# Patient Record
Sex: FEMALE | Race: WHITE | Employment: UNEMPLOYED | URBAN - METROPOLITAN AREA
[De-identification: names, ages, dates, MRNs, and addresses within clinical notes are randomized per-mention and may not be internally consistent; named-entity substitution may affect disease eponyms.]

---

## 2017-07-11 ENCOUNTER — OFFICE VISIT (OUTPATIENT)
Dept: ORTHOPEDIC SURGERY | Age: 42
End: 2017-07-11

## 2017-07-11 VITALS
WEIGHT: 214 LBS | BODY MASS INDEX: 37.92 KG/M2 | HEIGHT: 63 IN | SYSTOLIC BLOOD PRESSURE: 135 MMHG | DIASTOLIC BLOOD PRESSURE: 92 MMHG | HEART RATE: 77 BPM

## 2017-07-11 DIAGNOSIS — M25.521 BILATERAL ELBOW JOINT PAIN: Primary | ICD-10-CM

## 2017-07-11 DIAGNOSIS — M25.522 BILATERAL ELBOW JOINT PAIN: Primary | ICD-10-CM

## 2017-07-11 DIAGNOSIS — G56.23 CUBITAL TUNNEL SYNDROME OF BOTH UPPER EXTREMITIES: ICD-10-CM

## 2017-07-11 PROCEDURE — 99243 OFF/OP CNSLTJ NEW/EST LOW 30: CPT | Performed by: ORTHOPAEDIC SURGERY

## 2017-07-11 RX ORDER — ACETAMINOPHEN 325 MG/1
650 TABLET ORAL EVERY 6 HOURS PRN
COMMUNITY
End: 2017-07-11 | Stop reason: ALTCHOICE

## 2017-07-13 ENCOUNTER — OFFICE VISIT (OUTPATIENT)
Dept: ORTHOPEDIC SURGERY | Age: 42
End: 2017-07-13

## 2017-07-13 DIAGNOSIS — M79.601 PAIN IN BOTH UPPER EXTREMITIES: Primary | ICD-10-CM

## 2017-07-13 DIAGNOSIS — M79.602 PAIN IN BOTH UPPER EXTREMITIES: Primary | ICD-10-CM

## 2017-07-13 PROCEDURE — 95909 NRV CNDJ TST 5-6 STUDIES: CPT | Performed by: PHYSICAL MEDICINE & REHABILITATION

## 2017-07-13 PROCEDURE — 95886 MUSC TEST DONE W/N TEST COMP: CPT | Performed by: PHYSICAL MEDICINE & REHABILITATION

## 2017-07-18 ENCOUNTER — INITIAL CONSULT (OUTPATIENT)
Dept: SURGERY | Age: 42
End: 2017-07-18

## 2017-07-18 ENCOUNTER — OFFICE VISIT (OUTPATIENT)
Dept: ORTHOPEDIC SURGERY | Age: 42
End: 2017-07-18

## 2017-07-18 VITALS — WEIGHT: 212.96 LBS | BODY MASS INDEX: 39.19 KG/M2 | HEIGHT: 62 IN

## 2017-07-18 VITALS
SYSTOLIC BLOOD PRESSURE: 122 MMHG | DIASTOLIC BLOOD PRESSURE: 80 MMHG | BODY MASS INDEX: 37.98 KG/M2 | HEIGHT: 62 IN | WEIGHT: 206.4 LBS | HEART RATE: 81 BPM

## 2017-07-18 DIAGNOSIS — G56.23 CUBITAL TUNNEL SYNDROME OF BOTH UPPER EXTREMITIES: ICD-10-CM

## 2017-07-18 DIAGNOSIS — M79.601 PAIN IN BOTH UPPER EXTREMITIES: Primary | ICD-10-CM

## 2017-07-18 DIAGNOSIS — R10.11 RUQ ABDOMINAL PAIN: ICD-10-CM

## 2017-07-18 DIAGNOSIS — M79.602 PAIN IN BOTH UPPER EXTREMITIES: Primary | ICD-10-CM

## 2017-07-18 PROCEDURE — 99243 OFF/OP CNSLTJ NEW/EST LOW 30: CPT | Performed by: SURGERY

## 2017-07-18 PROCEDURE — 99213 OFFICE O/P EST LOW 20 MIN: CPT | Performed by: ORTHOPAEDIC SURGERY

## 2017-07-18 RX ORDER — HYDROCHLOROTHIAZIDE 12.5 MG/1
TABLET ORAL
COMMUNITY
Start: 2017-07-17 | End: 2017-10-23 | Stop reason: ALTCHOICE

## 2017-07-18 RX ORDER — METHYLPREDNISOLONE 4 MG/1
TABLET ORAL
Qty: 1 KIT | Refills: 0 | Status: SHIPPED | OUTPATIENT
Start: 2017-07-18 | End: 2017-07-24

## 2017-07-18 RX ORDER — HYDROXYZINE PAMOATE 25 MG/1
CAPSULE ORAL
COMMUNITY
Start: 2017-07-17 | End: 2017-10-23 | Stop reason: ALTCHOICE

## 2017-07-18 RX ORDER — PAROXETINE HYDROCHLORIDE 20 MG/1
TABLET, FILM COATED ORAL
COMMUNITY
Start: 2017-07-17 | End: 2017-10-23 | Stop reason: ALTCHOICE

## 2017-07-19 DIAGNOSIS — R10.11 RUQ ABDOMINAL PAIN: Primary | ICD-10-CM

## 2017-07-27 ENCOUNTER — HOSPITAL ENCOUNTER (OUTPATIENT)
Dept: NUCLEAR MEDICINE | Age: 42
Discharge: HOME | End: 2017-07-27
Attending: SURGERY | Admitting: SURGERY

## 2017-07-27 DIAGNOSIS — R10.11 RUQ ABDOMINAL PAIN: ICD-10-CM

## 2017-07-27 DIAGNOSIS — R10.11 RIGHT UPPER QUADRANT PAIN: ICD-10-CM

## 2017-07-27 RX ADMIN — Medication 6 MILLICURIE: at 08:12

## 2017-08-07 ENCOUNTER — HOSPITAL ENCOUNTER (OUTPATIENT)
Dept: CT IMAGING | Age: 42
Discharge: HOME | End: 2017-08-07
Attending: SURGERY | Admitting: SURGERY

## 2017-08-07 DIAGNOSIS — R10.11 RUQ ABDOMINAL PAIN: ICD-10-CM

## 2017-08-07 DIAGNOSIS — R10.11 RIGHT UPPER QUADRANT PAIN: ICD-10-CM

## 2017-08-09 ENCOUNTER — TELEPHONE (OUTPATIENT)
Dept: SURGERY | Age: 42
End: 2017-08-09

## 2017-09-20 ENCOUNTER — HOSPITAL ENCOUNTER (OUTPATIENT)
Dept: MAMMOGRAPHY | Age: 42
Discharge: HOME | End: 2017-09-20
Attending: NURSE PRACTITIONER | Admitting: NURSE PRACTITIONER

## 2017-09-20 DIAGNOSIS — Z12.31 VISIT FOR SCREENING MAMMOGRAM: ICD-10-CM

## 2017-10-23 ENCOUNTER — HOSPITAL ENCOUNTER (OUTPATIENT)
Dept: SURGERY | Age: 42
Discharge: HOME | End: 2017-10-23
Attending: INTERNAL MEDICINE | Admitting: INTERNAL MEDICINE

## 2017-10-23 VITALS
WEIGHT: 214 LBS | RESPIRATION RATE: 16 BRPM | OXYGEN SATURATION: 100 % | HEART RATE: 90 BPM | TEMPERATURE: 97.3 F | DIASTOLIC BLOOD PRESSURE: 77 MMHG | SYSTOLIC BLOOD PRESSURE: 118 MMHG | HEIGHT: 62 IN | BODY MASS INDEX: 39.38 KG/M2

## 2017-10-23 DIAGNOSIS — R10.11 RIGHT UPPER QUADRANT PAIN: ICD-10-CM

## 2017-10-23 LAB — PREGNANCY, URINE: NEGATIVE

## 2017-10-23 RX ORDER — ACETAMINOPHEN 325 MG/1
650 TABLET ORAL EVERY 6 HOURS PRN
COMMUNITY

## 2017-10-23 RX ORDER — IBUPROFEN 200 MG
200 TABLET ORAL EVERY 6 HOURS PRN
COMMUNITY
End: 2020-11-10

## 2017-10-23 RX ORDER — LIDOCAINE HYDROCHLORIDE 10 MG/ML
0.1 INJECTION, SOLUTION INFILTRATION; PERINEURAL ONCE
Status: DISCONTINUED | OUTPATIENT
Start: 2017-10-23 | End: 2017-10-24 | Stop reason: HOSPADM

## 2017-10-23 RX ORDER — SODIUM CHLORIDE, SODIUM LACTATE, POTASSIUM CHLORIDE, CALCIUM CHLORIDE 600; 310; 30; 20 MG/100ML; MG/100ML; MG/100ML; MG/100ML
INJECTION, SOLUTION INTRAVENOUS CONTINUOUS
Status: DISCONTINUED | OUTPATIENT
Start: 2017-10-23 | End: 2017-10-24 | Stop reason: HOSPADM

## 2017-10-23 RX ADMIN — SODIUM CHLORIDE, SODIUM LACTATE, POTASSIUM CHLORIDE, CALCIUM CHLORIDE: 600; 310; 30; 20 INJECTION, SOLUTION INTRAVENOUS at 13:22

## 2017-10-23 ASSESSMENT — PAIN DESCRIPTION - DESCRIPTORS
DESCRIPTORS: DULL
DESCRIPTORS: SHARP

## 2017-10-23 ASSESSMENT — PAIN DESCRIPTION - LOCATION: LOCATION: ABDOMEN

## 2017-10-23 ASSESSMENT — PAIN - FUNCTIONAL ASSESSMENT: PAIN_FUNCTIONAL_ASSESSMENT: 0-10

## 2017-10-23 ASSESSMENT — PAIN SCALES - GENERAL
PAINLEVEL_OUTOF10: 0
PAINLEVEL_OUTOF10: 2
PAINLEVEL_OUTOF10: 0
PAINLEVEL_OUTOF10: 0

## 2017-10-23 ASSESSMENT — PAIN DESCRIPTION - ORIENTATION: ORIENTATION: RIGHT;UPPER

## 2017-10-23 NOTE — H&P
Pre-sedation Assessment    HPI: abdominal pain  Nurses notes reviewed and agreed. Medications and Allergies reviewed    Physical Exam:  Airway:Normal  Cardiac:Normal  Pulmonary:Normal  Abdomen:Normal    Assessment/Plan:  ASA Grade 2 - Patient with mild systemic disease with no functional limitations  Level of Sedation Plan: Moderate sedation  Post Procedure plan: Return to same level of care  I have explained the risk, benefits, and alternatives to the procedure. The patient understands and agrees to proceed.   Yes    Keaton Velez  1:54 PM 10/23/2017

## 2017-10-23 NOTE — PROGRESS NOTES
Advanced pt from lying to sitting without adverse event/pt denies complaints of dizziness/ponv/ RESP  WNL/ surgical drsg/circulation checks on operative limb unchanged from my  last pacu assessment entry     Pt states pain is at a tolerable level-     instructed pt if no void in 8 hours contact physician or go to ER    Discharge instructions reviewed with patient/responsible adult and understanding verbalized. Discharge instructions signed and copies given. Rx                    Given to pts responsible adult/instructed not to drive/ingest alcohol while taking narcotic medications. Instructed pt/family member Last dose of narcotics given in recovery room was              @             Am/pm  . Medication information  sheet given to pt/family-all questions answered     Please follow narcotic administration as prescribed by your surgeon- any questions call your surgeon. If you have any problems contact your surgeon and  Go to the emergency room.     Operative drsg unchanged from my initial pacu assessment    Assisted pt to passenger side of car in no acute distress-/  with significant other as - VSS upon d/c

## 2017-10-24 NOTE — OP NOTE
[address]                               OPERATIVE REPORT    PATIENT NAME: Dipesh Bob                   :             1975  MED REC NO:   [id number]                           ROOM:  ACCOUNT NO:   [de-identified]                           ADMISSION DATE:  10/23/2017  PROVIDER:     Bijal Mcclain MD    DATE OF PROCEDURE:  10/23/2017      PREOPERATIVE DIAGNOSIS:  Abdominal pain. POSTOPERATIVE DIAGNOSIS:  Gastritis. PROCEDURE DONE:  EGD and biopsy. SURGEON:  Bijal Mcclain MD    INDICATIONS:  This is a 51-year-old referred for evaluation because of  abdominal pain. Gallbladder workup was essentially negative including  HIDA scan. OPERATIVE PROCEDURE:  Informed consent was obtained. IV sedation was  given with fentanyl and Versed. Continuous cardiac, oxygen, and blood  pressure monitoring done. ESOPHAGUS:  Upper, middle, and lower esophagus normal.    STOMACH:  Cardia, fundus normal.  The junction of the body and fundus  has gastritis. Biopsy obtained. Antrum normal.  Pylorus and mid  duodenum normal.    IMPRESSION:  Gastritis of fundus and body normal, otherwise  suggestions will be to go and continue with present therapy, have her  avoid NSAIDs and we will follow her clinical response.           Carola Pope MD    D: 10/23/2017 14:25:37       T: 10/23/2017 15:31:58     [id number]  Job#: [id number]     Doc#: [id number]    CC:  Kwan Hankins NP

## 2017-12-19 ENCOUNTER — HOSPITAL ENCOUNTER (OUTPATIENT)
Dept: OTHER | Age: 42
Discharge: HOME | End: 2017-12-19
Attending: INTERNAL MEDICINE | Admitting: INTERNAL MEDICINE

## 2017-12-20 LAB — H PYLORI BREATH TEST: NEGATIVE

## 2018-02-13 ENCOUNTER — TELEPHONE (OUTPATIENT)
Dept: ORTHOPEDIC SURGERY | Age: 43
End: 2018-02-13

## 2020-11-09 ENCOUNTER — APPOINTMENT (OUTPATIENT)
Dept: GENERAL RADIOLOGY | Age: 45
End: 2020-11-09
Payer: COMMERCIAL

## 2020-11-09 ENCOUNTER — HOSPITAL ENCOUNTER (EMERGENCY)
Age: 45
Discharge: HOME | End: 2020-11-10
Attending: EMERGENCY MEDICINE
Payer: COMMERCIAL

## 2020-11-09 VITALS
OXYGEN SATURATION: 98 % | TEMPERATURE: 98.9 F | WEIGHT: 230 LBS | HEART RATE: 91 BPM | RESPIRATION RATE: 18 BRPM | HEIGHT: 63 IN | BODY MASS INDEX: 40.75 KG/M2

## 2020-11-09 LAB
A/G RATIO: 1.4 (ref 1.1–2.2)
ALBUMIN SERPL-MCNC: 4.5 G/DL (ref 3.4–5)
ALP BLD-CCNC: 118 U/L (ref 40–129)
ALT SERPL-CCNC: 97 U/L (ref 10–40)
ANION GAP SERPL CALCULATED.3IONS-SCNC: 12 MMOL/L (ref 3–16)
AST SERPL-CCNC: 89 U/L (ref 15–37)
BACTERIA: ABNORMAL /HPF
BASOPHILS ABSOLUTE: 0.1 K/UL (ref 0–0.2)
BASOPHILS RELATIVE PERCENT: 1 %
BILIRUB SERPL-MCNC: 0.5 MG/DL (ref 0–1)
BILIRUBIN URINE: NEGATIVE
BLOOD, URINE: ABNORMAL
BUN BLDV-MCNC: 10 MG/DL (ref 7–20)
CALCIUM SERPL-MCNC: 9.2 MG/DL (ref 8.3–10.6)
CHLORIDE BLD-SCNC: 107 MMOL/L (ref 99–110)
CLARITY: ABNORMAL
CO2: 23 MMOL/L (ref 21–32)
COLOR: YELLOW
CREAT SERPL-MCNC: 0.6 MG/DL (ref 0.6–1.1)
CRYSTALS, UA: ABNORMAL /HPF
EOSINOPHILS ABSOLUTE: 0.1 K/UL (ref 0–0.6)
EOSINOPHILS RELATIVE PERCENT: 1.5 %
EPITHELIAL CELLS, UA: ABNORMAL /HPF (ref 0–5)
GFR AFRICAN AMERICAN: >60
GFR NON-AFRICAN AMERICAN: >60
GLOBULIN: 3.3 G/DL
GLUCOSE BLD-MCNC: 119 MG/DL (ref 70–99)
GLUCOSE URINE: NEGATIVE MG/DL
HCG QUALITATIVE: NEGATIVE
HCT VFR BLD CALC: 46.7 % (ref 36–48)
HEMOGLOBIN: 15.6 G/DL (ref 12–16)
KETONES, URINE: NEGATIVE MG/DL
LEUKOCYTE ESTERASE, URINE: ABNORMAL
LIPASE: 31 U/L (ref 13–60)
LYMPHOCYTES ABSOLUTE: 1.2 K/UL (ref 1–5.1)
LYMPHOCYTES RELATIVE PERCENT: 18.4 %
MCH RBC QN AUTO: 30.9 PG (ref 26–34)
MCHC RBC AUTO-ENTMCNC: 33.5 G/DL (ref 31–36)
MCV RBC AUTO: 92.3 FL (ref 80–100)
MICROSCOPIC EXAMINATION: YES
MONOCYTES ABSOLUTE: 1 K/UL (ref 0–1.3)
MONOCYTES RELATIVE PERCENT: 14.6 %
NEUTROPHILS ABSOLUTE: 4.4 K/UL (ref 1.7–7.7)
NEUTROPHILS RELATIVE PERCENT: 64.5 %
NITRITE, URINE: POSITIVE
PDW BLD-RTO: 13.2 % (ref 12.4–15.4)
PH UA: 5 (ref 5–8)
PLATELET # BLD: 197 K/UL (ref 135–450)
PMV BLD AUTO: 10.3 FL (ref 5–10.5)
POTASSIUM REFLEX MAGNESIUM: 4.2 MMOL/L (ref 3.5–5.1)
PROTEIN UA: NEGATIVE MG/DL
RAPID INFLUENZA  B AGN: NEGATIVE
RAPID INFLUENZA A AGN: NEGATIVE
RBC # BLD: 5.06 M/UL (ref 4–5.2)
RBC UA: ABNORMAL /HPF (ref 0–4)
S PYO AG THROAT QL: NEGATIVE
SODIUM BLD-SCNC: 142 MMOL/L (ref 136–145)
SPECIFIC GRAVITY UA: >=1.03 (ref 1–1.03)
TOTAL PROTEIN: 7.8 G/DL (ref 6.4–8.2)
URINE REFLEX TO CULTURE: ABNORMAL
URINE TYPE: ABNORMAL
UROBILINOGEN, URINE: 0.2 E.U./DL
WBC # BLD: 6.8 K/UL (ref 4–11)
WBC UA: ABNORMAL /HPF (ref 0–5)

## 2020-11-09 PROCEDURE — 71045 X-RAY EXAM CHEST 1 VIEW: CPT

## 2020-11-09 PROCEDURE — 87081 CULTURE SCREEN ONLY: CPT

## 2020-11-09 PROCEDURE — 83690 ASSAY OF LIPASE: CPT

## 2020-11-09 PROCEDURE — 87880 STREP A ASSAY W/OPTIC: CPT

## 2020-11-09 PROCEDURE — U0002 COVID-19 LAB TEST NON-CDC: HCPCS

## 2020-11-09 PROCEDURE — 87804 INFLUENZA ASSAY W/OPTIC: CPT

## 2020-11-09 PROCEDURE — 84703 CHORIONIC GONADOTROPIN ASSAY: CPT

## 2020-11-09 PROCEDURE — 85025 COMPLETE CBC W/AUTO DIFF WBC: CPT

## 2020-11-09 PROCEDURE — 6360000002 HC RX W HCPCS: Performed by: EMERGENCY MEDICINE

## 2020-11-09 PROCEDURE — 99284 EMERGENCY DEPT VISIT MOD MDM: CPT

## 2020-11-09 PROCEDURE — 6370000000 HC RX 637 (ALT 250 FOR IP): Performed by: EMERGENCY MEDICINE

## 2020-11-09 PROCEDURE — 80053 COMPREHEN METABOLIC PANEL: CPT

## 2020-11-09 PROCEDURE — U0003 INFECTIOUS AGENT DETECTION BY NUCLEIC ACID (DNA OR RNA); SEVERE ACUTE RESPIRATORY SYNDROME CORONAVIRUS 2 (SARS-COV-2) (CORONAVIRUS DISEASE [COVID-19]), AMPLIFIED PROBE TECHNIQUE, MAKING USE OF HIGH THROUGHPUT TECHNOLOGIES AS DESCRIBED BY CMS-2020-01-R: HCPCS

## 2020-11-09 PROCEDURE — 96372 THER/PROPH/DIAG INJ SC/IM: CPT

## 2020-11-09 PROCEDURE — 36415 COLL VENOUS BLD VENIPUNCTURE: CPT

## 2020-11-09 PROCEDURE — 81001 URINALYSIS AUTO W/SCOPE: CPT

## 2020-11-09 RX ORDER — KETOROLAC TROMETHAMINE 30 MG/ML
30 INJECTION, SOLUTION INTRAMUSCULAR; INTRAVENOUS ONCE
Status: COMPLETED | OUTPATIENT
Start: 2020-11-09 | End: 2020-11-09

## 2020-11-09 RX ORDER — AZITHROMYCIN 250 MG/1
500 TABLET, FILM COATED ORAL ONCE
Status: COMPLETED | OUTPATIENT
Start: 2020-11-09 | End: 2020-11-09

## 2020-11-09 RX ORDER — DEXAMETHASONE SODIUM PHOSPHATE 10 MG/ML
10 INJECTION, SOLUTION INTRAMUSCULAR; INTRAVENOUS ONCE
Status: COMPLETED | OUTPATIENT
Start: 2020-11-09 | End: 2020-11-09

## 2020-11-09 RX ORDER — CELECOXIB 200 MG/1
200 CAPSULE ORAL 2 TIMES DAILY
COMMUNITY

## 2020-11-09 RX ORDER — ONDANSETRON 4 MG/1
4 TABLET, ORALLY DISINTEGRATING ORAL ONCE
Status: COMPLETED | OUTPATIENT
Start: 2020-11-09 | End: 2020-11-09

## 2020-11-09 RX ORDER — ATORVASTATIN CALCIUM 10 MG/1
10 TABLET, FILM COATED ORAL DAILY
COMMUNITY

## 2020-11-09 RX ADMIN — KETOROLAC TROMETHAMINE 30 MG: 30 INJECTION, SOLUTION INTRAMUSCULAR; INTRAVENOUS at 23:17

## 2020-11-09 RX ADMIN — AZITHROMYCIN 500 MG: 250 TABLET, FILM COATED ORAL at 23:18

## 2020-11-09 RX ADMIN — DEXAMETHASONE SODIUM PHOSPHATE 10 MG: 10 INJECTION, SOLUTION INTRAMUSCULAR; INTRAVENOUS at 23:17

## 2020-11-09 RX ADMIN — ONDANSETRON 4 MG: 4 TABLET, ORALLY DISINTEGRATING ORAL at 23:18

## 2020-11-09 ASSESSMENT — PAIN DESCRIPTION - PAIN TYPE: TYPE: ACUTE PAIN

## 2020-11-09 ASSESSMENT — PAIN SCALES - GENERAL
PAINLEVEL_OUTOF10: 8
PAINLEVEL_OUTOF10: 8

## 2020-11-09 ASSESSMENT — PAIN DESCRIPTION - PROGRESSION: CLINICAL_PROGRESSION: GRADUALLY WORSENING

## 2020-11-09 ASSESSMENT — PAIN DESCRIPTION - DESCRIPTORS: DESCRIPTORS: ACHING

## 2020-11-10 RX ORDER — BENZONATATE 200 MG/1
200 CAPSULE ORAL 3 TIMES DAILY PRN
Qty: 30 CAPSULE | Refills: 0 | Status: SHIPPED | OUTPATIENT
Start: 2020-11-10 | End: 2020-11-17

## 2020-11-10 RX ORDER — IBUPROFEN 600 MG/1
600 TABLET ORAL EVERY 8 HOURS PRN
Qty: 20 TABLET | Refills: 0 | Status: SHIPPED | OUTPATIENT
Start: 2020-11-10 | End: 2020-11-17

## 2020-11-10 RX ORDER — ONDANSETRON 4 MG/1
4 TABLET, ORALLY DISINTEGRATING ORAL EVERY 8 HOURS PRN
Qty: 12 TABLET | Refills: 0 | Status: SHIPPED | OUTPATIENT
Start: 2020-11-10

## 2020-11-10 RX ORDER — AMOXICILLIN AND CLAVULANATE POTASSIUM 875; 125 MG/1; MG/1
1 TABLET, FILM COATED ORAL 2 TIMES DAILY
Qty: 14 TABLET | Refills: 0 | Status: SHIPPED | OUTPATIENT
Start: 2020-11-10 | End: 2020-11-17

## 2020-11-10 RX ORDER — FLUCONAZOLE 150 MG/1
150 TABLET ORAL ONCE
Qty: 2 TABLET | Refills: 0 | Status: SHIPPED | OUTPATIENT
Start: 2020-11-10 | End: 2020-11-10

## 2020-11-10 NOTE — ED PROVIDER NOTES
CHIEF COMPLAINT  Generalized Body Aches      HISTORY OF PRESENT ILLNESS  Cristal Vick is a 39 y.o. female presents to the ED with fever and chills, started 2 days ago, feeling tired and body aches, she does report hx of asthma, occasional wheezing, no known exposure but concerned about covid and would like to be tested, also started having vomiting/diarrhea today, 1 episode of vomiting, nonbloody/nonbilious, a few loose stools, no melena/hematochezia. No chest pain, no SOB, A little nauseated now, She denies concern for pregnancy, no dysuria/hematuria, she brought her adult daughter to get checked today as well, has been taking her home pain meds for pain, No other complaints, modifying factors or associated symptoms. I have reviewed the following from the nursing documentation. Past Medical History:   Diagnosis Date    Arthritis     RUQ abdominal pain      Past Surgical History:   Procedure Laterality Date    ENDOMETRIAL ABLATION      TONSILLECTOMY AND ADENOIDECTOMY      UPPER GASTROINTESTINAL ENDOSCOPY  10/23/2017    UTERINE FIBROID SURGERY       History reviewed. No pertinent family history.   Social History     Socioeconomic History    Marital status:      Spouse name: Not on file    Number of children: Not on file    Years of education: Not on file    Highest education level: Not on file   Occupational History    Not on file   Social Needs    Financial resource strain: Not on file    Food insecurity     Worry: Not on file     Inability: Not on file    Transportation needs     Medical: Not on file     Non-medical: Not on file   Tobacco Use    Smoking status: Never Smoker   Substance and Sexual Activity    Alcohol use: No    Drug use: Not on file    Sexual activity: Not on file   Lifestyle    Physical activity     Days per week: Not on file     Minutes per session: Not on file    Stress: Not on file   Relationships    Social connections     Talks on phone: Not on file Gets together: Not on file     Attends Hoahaoism service: Not on file     Active member of club or organization: Not on file     Attends meetings of clubs or organizations: Not on file     Relationship status: Not on file    Intimate partner violence     Fear of current or ex partner: Not on file     Emotionally abused: Not on file     Physically abused: Not on file     Forced sexual activity: Not on file   Other Topics Concern    Not on file   Social History Narrative    Not on file     No current facility-administered medications for this encounter. Current Outpatient Medications   Medication Sig Dispense Refill    amoxicillin-clavulanate (AUGMENTIN) 875-125 MG per tablet Take 1 tablet by mouth 2 times daily for 7 days 14 tablet 0    fluconazole (DIFLUCAN) 150 MG tablet Take 1 tablet by mouth once for 1 dose Take after the antibiotics for yeast infection, take the second dose in 72 hours if the first did not resolve symptoms.  2 tablet 0    ondansetron (ZOFRAN ODT) 4 MG disintegrating tablet Take 1 tablet by mouth every 8 hours as needed for Nausea or Vomiting 12 tablet 0    ibuprofen (ADVIL;MOTRIN) 600 MG tablet Take 1 tablet by mouth every 8 hours as needed for Pain With food 20 tablet 0    benzonatate (TESSALON) 200 MG capsule Take 1 capsule by mouth 3 times daily as needed for Cough 30 capsule 0    celecoxib (CELEBREX) 200 MG capsule Take 200 mg by mouth 2 times daily      metFORMIN (GLUCOPHAGE) 500 MG tablet Take 500 mg by mouth 2 times daily (with meals)      atorvastatin (LIPITOR) 10 MG tablet Take 10 mg by mouth daily      KRILL OIL PO Take by mouth      acetaminophen (TYLENOL) 325 MG tablet Take 650 mg by mouth every 6 hours as needed for Pain       Allergies   Allergen Reactions    Compazine [Prochlorperazine Maleate] Other (See Comments)    Vicodin [Hydrocodone-Acetaminophen] Anaphylaxis    Codeine        REVIEW OF SYSTEMS  10 systems reviewed, pertinent positives per HPI otherwise noted to be negative. PHYSICAL EXAM  Pulse 91   Temp 98.9 °F (37.2 °C) (Oral)   Resp 18   Ht 5' 3\" (1.6 m)   Wt 230 lb (104.3 kg)   LMP  (LMP Unknown)   SpO2 98%   BMI 40.74 kg/m²  patient did have BP taken, I reviewed and was normal, appears it was not recorded by nursing  GENERAL APPEARANCE: Awake and alert. Cooperative. No acute distress, BMI 40.74  HEAD: Normocephalic. Atraumatic. EYES: PERRL. EOM's grossly intact. No conjunctival injection  ENT: Mucous membranes are moist. Tonsils surgically absent, no exudates, no ulcers/lesions, midline uvula, TMs w/o bulging/erythema/edema b/l, sounds a little congested, no otorrhea/rhinorrhea  NECK: Supple. No rigidity, no adenopathy, nml ROM  HEART: RRR. No murmurs  LUNGS: Respirations nonlabored. Lungs are w/ coarse breath sounds, a few scattered end exp wheezes, a few rhonchi b/l, no crackles, occasional dry hacking cough. ABDOMEN: Soft. Non-distended. Non-tender. No guarding or rebound. Normal bowel sounds. rotund  EXTREMITIES: No peripheral edema. No calf tenderness, Moves all extremities equally. SKIN: Warm and dry. No acute rashes. NEUROLOGICAL: Alert and oriented. No gross facial drooping. Normal speech  PSYCHIATRIC: Normal mood and affect. LABS  I have reviewed all labs for this visit.    Results for orders placed or performed during the hospital encounter of 11/09/20   Rapid influenza A/B antigens    Specimen: Nasopharyngeal   Result Value Ref Range    Rapid Influenza A Ag Negative Negative    Rapid Influenza B Ag Negative Negative   Strep Screen Group A Throat    Specimen: Throat   Result Value Ref Range    Rapid Strep A Screen Negative Negative   CBC Auto Differential   Result Value Ref Range    WBC 6.8 4.0 - 11.0 K/uL    RBC 5.06 4.00 - 5.20 M/uL    Hemoglobin 15.6 12.0 - 16.0 g/dL    Hematocrit 46.7 36.0 - 48.0 %    MCV 92.3 80.0 - 100.0 fL    MCH 30.9 26.0 - 34.0 pg    MCHC 33.5 31.0 - 36.0 g/dL    RDW 13.2 12.4 - 15.4 %    Platelets 349 135 - 450 K/uL    MPV 10.3 5.0 - 10.5 fL    Neutrophils % 64.5 %    Lymphocytes % 18.4 %    Monocytes % 14.6 %    Eosinophils % 1.5 %    Basophils % 1.0 %    Neutrophils Absolute 4.4 1.7 - 7.7 K/uL    Lymphocytes Absolute 1.2 1.0 - 5.1 K/uL    Monocytes Absolute 1.0 0.0 - 1.3 K/uL    Eosinophils Absolute 0.1 0.0 - 0.6 K/uL    Basophils Absolute 0.1 0.0 - 0.2 K/uL   Comprehensive Metabolic Panel w/ Reflex to MG   Result Value Ref Range    Sodium 142 136 - 145 mmol/L    Potassium reflex Magnesium 4.2 3.5 - 5.1 mmol/L    Chloride 107 99 - 110 mmol/L    CO2 23 21 - 32 mmol/L    Anion Gap 12 3 - 16    Glucose 119 (H) 70 - 99 mg/dL    BUN 10 7 - 20 mg/dL    CREATININE 0.6 0.6 - 1.1 mg/dL    GFR Non-African American >60 >60    GFR African American >60 >60    Calcium 9.2 8.3 - 10.6 mg/dL    Total Protein 7.8 6.4 - 8.2 g/dL    Alb 4.5 3.4 - 5.0 g/dL    Albumin/Globulin Ratio 1.4 1.1 - 2.2    Total Bilirubin 0.5 0.0 - 1.0 mg/dL    Alkaline Phosphatase 118 40 - 129 U/L    ALT 97 (H) 10 - 40 U/L    AST 89 (H) 15 - 37 U/L    Globulin 3.3 g/dL   Lipase   Result Value Ref Range    Lipase 31.0 13.0 - 60.0 U/L   HCG Qualitative, Serum   Result Value Ref Range    hCG Qual Negative Detects HCG level >10 MIU/mL   Urinalysis Reflex to Culture    Specimen: Urine, clean catch   Result Value Ref Range    Color, UA Yellow Straw/Yellow    Clarity, UA SL CLOUDY (A) Clear    Glucose, Ur Negative Negative mg/dL    Bilirubin Urine Negative Negative    Ketones, Urine Negative Negative mg/dL    Specific Gravity, UA >=1.030 1.005 - 1.030    Blood, Urine SMALL (A) Negative    pH, UA 5.0 5.0 - 8.0    Protein, UA Negative Negative mg/dL    Urobilinogen, Urine 0.2 <2.0 E.U./dL    Nitrite, Urine POSITIVE (A) Negative    Leukocyte Esterase, Urine TRACE (A) Negative    Microscopic Examination YES     Urine Type NotGiven     Urine Reflex to Culture Not Indicated    Microscopic Urinalysis   Result Value Ref Range    WBC, UA 6-9 (A) 0 - 5 /HPF    RBC, UA 0-2 0 - 4 /HPF    Epithelial Cells, UA 6-10 (A) 0 - 5 /HPF    Bacteria, UA 4+ (A) None Seen /HPF    Crystals, UA Few Ca. Oxalate (A) None Seen /HPF       RADIOLOGY  Xr Chest Portable    Result Date: 11/10/2020  EXAMINATION: ONE XRAY VIEW OF THE CHEST 11/9/2020 11:12 pm COMPARISON: Chest radiographs 01/30/2013 HISTORY: ORDERING SYSTEM PROVIDED HISTORY: cough, aches TECHNOLOGIST PROVIDED HISTORY: Reason for exam:->cough, aches Reason for Exam: cough Acuity: Acute FINDINGS: No pneumothorax, pleural effusion or consolidative airspace disease. Normal heart size and mediastinal contours. No acute osseous abnormality. No acute findings. ED COURSE/MDM  Patient seen and evaluated. Old records reviewed. Labs and imaging reviewed and results discussed with patient. 46yo F w/ cough, reported fever, strep and flu negative, covid swab sent, discussed quarantine procedure, CXR w/o PNA, low suspicion for sepsis, low suspicion for RPA/PTA/meningitis/encephalitis, she did have an incidental UTI, d/t this and her asthma history, double covering w/ augmentin/azithro, no current suspicion for pyelo, she is nontoxic appearing, slight LFT elevation, she had been told she had fatty liver before, labs otherwise unremarkable, she requested diflucan for after abx since she often gets post abx yeast infection, Strict return precautions given, all questions answered, will return if any worsening symptoms or new concerns, see AVS for further discharge information, patient verbalized understanding of plan, felt comfortable going home.       Orders Placed This Encounter   Procedures    Rapid influenza A/B antigens    Strep Screen Group A Throat    Culture, Beta Strep Confirm Plates    XR CHEST PORTABLE    CBC Auto Differential    Comprehensive Metabolic Panel w/ Reflex to MG    Lipase    HCG Qualitative, Serum    Urinalysis Reflex to Culture    COVID-19    Microscopic Urinalysis     Orders Placed This Encounter   Medications    ketorolac (TORADOL) injection 30 mg    ondansetron (ZOFRAN-ODT) disintegrating tablet 4 mg    dexamethasone (PF) (DECADRON) injection 10 mg    azithromycin (ZITHROMAX) tablet 500 mg     Order Specific Question:   Antimicrobial Indications     Answer: Other     Order Specific Question:   Other Abx Indication     Answer:   asthma bronchitis    amoxicillin-clavulanate (AUGMENTIN) 875-125 MG per tablet     Sig: Take 1 tablet by mouth 2 times daily for 7 days     Dispense:  14 tablet     Refill:  0    fluconazole (DIFLUCAN) 150 MG tablet     Sig: Take 1 tablet by mouth once for 1 dose Take after the antibiotics for yeast infection, take the second dose in 72 hours if the first did not resolve symptoms. Dispense:  2 tablet     Refill:  0    ondansetron (ZOFRAN ODT) 4 MG disintegrating tablet     Sig: Take 1 tablet by mouth every 8 hours as needed for Nausea or Vomiting     Dispense:  12 tablet     Refill:  0    ibuprofen (ADVIL;MOTRIN) 600 MG tablet     Sig: Take 1 tablet by mouth every 8 hours as needed for Pain With food     Dispense:  20 tablet     Refill:  0    benzonatate (TESSALON) 200 MG capsule     Sig: Take 1 capsule by mouth 3 times daily as needed for Cough     Dispense:  30 capsule     Refill:  0          CLINICAL IMPRESSION  1. Mild intermittent asthmatic bronchitis with acute exacerbation    2. Cough    3. Nausea vomiting and diarrhea    4. Acute UTI    5. Encounter for laboratory testing for COVID-19 virus        Pulse 91, temperature 98.9 °F (37.2 °C), temperature source Oral, resp. rate 18, height 5' 3\" (1.6 m), weight 230 lb (104.3 kg), SpO2 98 %. Lavelle Goodpasture was discharged to home in stable condition.                   Gladis Hernandez, DO  11/16/20 [id number]

## 2020-11-11 ENCOUNTER — NURSE TRIAGE (OUTPATIENT)
Dept: OTHER | Facility: CLINIC | Age: 45
End: 2020-11-11

## 2020-11-11 ENCOUNTER — CARE COORDINATION (OUTPATIENT)
Dept: CARE COORDINATION | Age: 45
End: 2020-11-11

## 2020-11-11 LAB — SARS-COV-2, PCR: DETECTED

## 2020-11-11 NOTE — TELEPHONE ENCOUNTER
Patient called wanting test results from Covid test yesterday, advised pt results are back, but she will have to call to get them from the provider, states understanding and agrees with plan, no triage done. Reason for Disposition  Juanita Rosario Caller has already spoken with another triager or PCP (or office), and has further questions and triager able to answer questions.     Protocols used: NO CONTACT OR DUPLICATE CONTACT CALL-ADULT-[place]

## 2020-11-12 LAB — S PYO THROAT QL CULT: NORMAL

## 2020-11-12 NOTE — CARE COORDINATION
Patient contacted regarding OWICD-43 diagnosis\". Discussed COVID-19 related testing which was available at this time. Test results were positive. Patient informed of results, if available? Yes    Care Transition Nurse/ Ambulatory Care Manager contacted the patient by telephone to perform post discharge assessment. Call within 2 business days of discharge: Yes. Verified name and  with patient as identifiers. Provided introduction to self, and explanation of the CTN/ACM role, and reason for call due to risk factors for infection and/or exposure to COVID-19. Symptoms reviewed with patient who verbalized the following symptoms: fever, pain or aching joints, shortness of breath, chills or shaking, vomiting and diarrhea. Due to new onset of symptoms encounter was not routed to provider for escalation. Discussed follow-up appointments. If no appointment was previously scheduled, appointment scheduling offered: Yes  [facility] follow up appointment(s): No future appointments. Non-[facility] follow up appointment(s): n/a    Non-face-to-face services provided:  Pt encouraged to f/u with her provider. Advance Care Planning:   Does patient have an Advance Directive:  not reviewed during call. .     Patient has following risk factors of: no known risk factors. CTN/ACM reviewed discharge instructions, medical action plan and red flags such as increased shortness of breath, increasing fever and signs of decompensation with patient who verbalized understanding. Discussed exposure protocols and quarantine with CDC Guidelines What to do if you are sick with coronavirus disease 2019.  Patient was given an opportunity for questions and concerns. The patient agrees to contact the [facility] exposure line [phone], local [facility] department [place] [facility]: ([phone]) and PCP office for questions related to their healthcare. CTN/ACM provided contact information for future needs.     Reviewed and educated patient on any new and changed medications related to discharge diagnosis     Patient/family/caregiver given information for [facility] and agrees to enroll no  Patient's preferred e-mail:    Patient's preferred phone number:   Based on Loop alert triggers, patient will be contacted by nurse care manager for worsening symptoms. Plan for follow-up call in 3-5 days based on severity of symptoms and risk factors. Pt reports she has experienced some increase in difficulty breathing, some chest pain at times, and her H/A is worse. [facility] provided the nurse triage line as a resource if her symptoms continue or get worse. ACM also discussed symptoms to return to the ED such as continued increase in chest pain, SOB, difficulty breathing, confusion or dizziness. Pt reports she was not happy with the care she received in the ED and [facility] provided the Complaint/Grievance Line for [facility] as [facility] does not have the contact information for [place] [phone]. [place]. Pt accepted this information. AC informed pt she will hear from the local health department as well. ACM encouraged the patient to f/u with her PCP and to call with any questions/concerns. Pt agreed to have ACM f/u with her in the near future.

## 2020-11-19 ENCOUNTER — CARE COORDINATION (OUTPATIENT)
Dept: CARE COORDINATION | Age: 45
End: 2020-11-19

## 2020-11-19 NOTE — CARE COORDINATION
Outreach attempt regarding 1 week f/u from pt recent visit to the ED. Female answered phone and ACM asked to speak to pt. Female stated pt is unavailable and she would have her call ACM back. ACM ensured female had ACM contact information and thanked her for her time.

## 2022-02-28 ENCOUNTER — HOSPITAL ENCOUNTER (OUTPATIENT)
Dept: GENERAL RADIOLOGY | Age: 47
Discharge: HOME | End: 2022-02-28
Payer: COMMERCIAL

## 2022-02-28 DIAGNOSIS — U09.9 POST-COVID SYNDROME: ICD-10-CM

## 2022-02-28 PROCEDURE — 71046 X-RAY EXAM CHEST 2 VIEWS: CPT
